# Patient Record
Sex: FEMALE | Race: WHITE | Employment: UNEMPLOYED | URBAN - METROPOLITAN AREA
[De-identification: names, ages, dates, MRNs, and addresses within clinical notes are randomized per-mention and may not be internally consistent; named-entity substitution may affect disease eponyms.]

---

## 2022-10-22 ENCOUNTER — HOSPITAL ENCOUNTER (EMERGENCY)
Age: 42
Discharge: HOME | End: 2022-10-23
Attending: EMERGENCY MEDICINE
Payer: COMMERCIAL

## 2022-10-22 VITALS
OXYGEN SATURATION: 100 % | HEART RATE: 98 BPM | DIASTOLIC BLOOD PRESSURE: 81 MMHG | BODY MASS INDEX: 29.12 KG/M2 | WEIGHT: 215 LBS | RESPIRATION RATE: 16 BRPM | SYSTOLIC BLOOD PRESSURE: 142 MMHG | HEIGHT: 72 IN | TEMPERATURE: 97.3 F

## 2022-10-22 DIAGNOSIS — B02.31 HERPES ZOSTER CONJUNCTIVITIS: Primary | ICD-10-CM

## 2022-10-22 PROCEDURE — 74011000250 HC RX REV CODE- 250: Performed by: EMERGENCY MEDICINE

## 2022-10-22 PROCEDURE — 99283 EMERGENCY DEPT VISIT LOW MDM: CPT

## 2022-10-22 PROCEDURE — 74011250637 HC RX REV CODE- 250/637: Performed by: EMERGENCY MEDICINE

## 2022-10-22 PROCEDURE — 74011636637 HC RX REV CODE- 636/637: Performed by: EMERGENCY MEDICINE

## 2022-10-22 RX ORDER — PROPARACAINE HYDROCHLORIDE 5 MG/ML
2 SOLUTION/ DROPS OPHTHALMIC
Status: COMPLETED | OUTPATIENT
Start: 2022-10-22 | End: 2022-10-22

## 2022-10-22 RX ORDER — VALACYCLOVIR HYDROCHLORIDE 500 MG/1
1000 TABLET, FILM COATED ORAL
Status: COMPLETED | OUTPATIENT
Start: 2022-10-22 | End: 2022-10-22

## 2022-10-22 RX ORDER — PREDNISONE 20 MG/1
60 TABLET ORAL
Status: COMPLETED | OUTPATIENT
Start: 2022-10-22 | End: 2022-10-22

## 2022-10-22 RX ADMIN — PREDNISONE 60 MG: 20 TABLET ORAL at 22:05

## 2022-10-22 RX ADMIN — VALACYCLOVIR HYDROCHLORIDE 1000 MG: 500 TABLET, FILM COATED ORAL at 22:05

## 2022-10-22 RX ADMIN — FLUORESCEIN SODIUM 1 STRIP: 0.6 STRIP OPHTHALMIC at 22:06

## 2022-10-22 RX ADMIN — PROPARACAINE HYDROCHLORIDE 2 DROP: 5 SOLUTION/ DROPS OPHTHALMIC at 22:06

## 2022-10-23 RX ORDER — VALACYCLOVIR HYDROCHLORIDE 1 G/1
1000 TABLET, FILM COATED ORAL 3 TIMES DAILY
Qty: 21 TABLET | Refills: 0 | Status: SHIPPED | OUTPATIENT
Start: 2022-10-23 | End: 2022-10-30

## 2022-10-23 RX ORDER — PREDNISOLONE ACETATE 10 MG/ML
1 SUSPENSION/ DROPS OPHTHALMIC 4 TIMES DAILY
Qty: 5 ML | Refills: 0 | Status: SHIPPED | OUTPATIENT
Start: 2022-10-23

## 2022-10-23 RX ORDER — PREDNISONE 20 MG/1
60 TABLET ORAL DAILY
Qty: 15 TABLET | Refills: 0 | Status: SHIPPED | OUTPATIENT
Start: 2022-10-23 | End: 2022-10-28

## 2022-10-23 NOTE — ED TRIAGE NOTES
Pt presents to ED ambulatory from triage with c/o OD redness, facial blistery painful rash, pt c/o h/a behind OD;  pt seen at Urgent Care and tx for sinus infections with antibx

## 2022-11-03 NOTE — ED PROVIDER NOTES
EMERGENCY DEPARTMENT HISTORY AND PHYSICAL EXAM    Date: 10/22/2022  Patient Name: Melissa Mota    History of Presenting Illness     Chief Complaint   Patient presents with    Rash         History Provided By: Patient    Additional History (Context):   9:55 PM   Melissa Mota is a 38 y.o. female with PMHX of, thyroid disease, arthritis and chronic back pain who presents to the emergency department C/O pressure-like pain. Patient reports being seen at urgent care center started antibiotics for sinus infection. She now has right ocular involvement and blisters on her right forehead. Symptoms started about 2 or 3 days ago. She denies any visual changes or photosensitivity. Social History  No longer smokes rarely drinks alcohol    Family History  No immunocompromise      PCP: Blair Srivastava MD    Current Outpatient Medications   Medication Sig Dispense Refill    prednisoLONE acetate (Pred Forte) 1 % ophthalmic suspension Administer 1 Drop to right eye four (4) times daily. 5 mL 0    norethindrone-ethinyl estradiol (ORTHO-NOVUM 1-35 TAB, NORTREL 1-35 TAB) 1-35 mg-mcg per tablet Take 1 Tab by mouth. Past History     Past Medical History:  Past Medical History:   Diagnosis Date    Arthritis     Chronic pain     DDD disk back    Thyroid disease        Past Surgical History:  Past Surgical History:   Procedure Laterality Date    BREAST SURGERY PROCEDURE UNLISTED      right breast lumpectomy       Family History:  No family history on file. Social History:  Social History     Tobacco Use    Smoking status: Former   Substance Use Topics    Alcohol use: Yes     Comment: occasional monthly       Allergies:  No Known Allergies      Review of Systems   Review of Systems   HENT:  Positive for congestion. Eyes:  Positive for redness. Negative for visual disturbance. Skin:  Positive for rash. All other systems reviewed and are negative.     Physical Exam     Vitals:    10/22/22 2104   BP: (!) 142/81 Pulse: 98   Resp: 16   Temp: 97.3 °F (36.3 °C)   SpO2: 100%   Weight: 97.5 kg (215 lb)   Height: 6' (1.829 m)     Physical Exam  Vitals and nursing note reviewed. Constitutional:       General: She is not in acute distress. Appearance: She is well-developed. She is not diaphoretic. HENT:      Head: Normocephalic and atraumatic. Right Ear: Tympanic membrane and external ear normal.      Left Ear: Tympanic membrane and external ear normal.      Nose:      Comments: No White sign  Eyes:      General: Lids are normal. No scleral icterus. Extraocular Movements:      Right eye: Normal extraocular motion. Left eye: Normal extraocular motion. Conjunctiva/sclera:      Right eye: Right conjunctiva is injected. No chemosis, exudate or hemorrhage. Left eye: Left conjunctiva is not injected. No chemosis, exudate or hemorrhage. Pupils: Pupils are equal, round, and reactive to light. Right eye: No corneal abrasion or fluorescein uptake. Left eye: No corneal abrasion or fluorescein uptake. Slit lamp exam:     Right eye: Anterior chamber quiet. No corneal ulcer. Left eye: Anterior chamber quiet. No corneal ulcer. Visual Fields: Right eye visual fields normal and left eye visual fields normal.      Comments: Trace vesicles noted to the upper eyelid. Neck:      Trachea: No tracheal deviation. Cardiovascular:      Rate and Rhythm: Normal rate. Pulmonary:      Effort: Pulmonary effort is normal. No respiratory distress. Breath sounds: No stridor. Musculoskeletal:         General: No tenderness. Normal range of motion. Cervical back: Normal range of motion and neck supple. Comments: Grossly unremarkable without abnormalities   Skin:     General: Skin is warm and dry. Capillary Refill: Capillary refill takes less than 2 seconds. Findings: Rash present. No erythema. Rash is vesicular.       Comments: Small vesicles to right side V1 dermatome   Neurological:      Mental Status: She is alert and oriented to person, place, and time. GCS: GCS eye subscore is 4. GCS verbal subscore is 5. GCS motor subscore is 6. Cranial Nerves: No cranial nerve deficit. Motor: No weakness. Psychiatric:         Mood and Affect: Mood normal.         Behavior: Behavior normal.         Thought Content: Thought content normal.         Judgment: Judgment normal.       Diagnostic Study Results     Labs -  No results found for this or any previous visit (from the past 24 hour(s)). Radiologic Studies -   No orders to display     CT Results  (Last 48 hours)      None          CXR Results  (Last 48 hours)      None            Medications given in the ED-  Medications   predniSONE (DELTASONE) tablet 60 mg (60 mg Oral Given 10/22/22 2205)   valACYclovir (VALTREX) tablet 1,000 mg (1,000 mg Oral Given 10/22/22 2205)   proparacaine (OPTHAINE) 0.5 % ophthalmic solution 2 Drop (2 Drops Right Eye Given 10/22/22 2206)   fluorescein (FLU-HILDA) 0.6 mg ophthalmic strip 1 Strip (1 Strip Both Eyes Given 10/22/22 2206)         Medical Decision Making   I am the first provider for this patient. I reviewed the vital signs, available nursing notes, past medical history, past surgical history, family history and social history. Vital Signs-Reviewed the patient's vital signs. Pulse Oximetry Analysis - 100% on room air    Records Reviewed: NURSING NOTES AND PREVIOUS MEDICAL RECORDS    Provider Notes (Medical Decision Making):   Patient with rash for hip pain likely be shingles or herpes. See no evidence of secondary bacterial infection. No photosensitivity. I cannot find dendrites for slit-lamp examination. We will start her on antivirals as well as steroids and refer to ophthalmology. There is no ophthalmologist on-call for our group but I do not see indication for emergency transfer with an ocular involvement.     Procedures:  Eye Stain      Date/Time: 10/22/2022 10:48 PM            Corneal abrasion was not present on eyelid eversion. Cornea is clear. Anterior chamber is clear. Patient tolerance: patient tolerated the procedure well with no immediate complications      ED Course:   9:55 PM : Initial assessment performed. The patients presenting problems have been discussed, and they are in agreement with the care plan formulated and outlined with them. I have encouraged them to ask questions as they arise throughout their visit. Diagnosis and Disposition       DISCHARGE NOTE:  12:52 AM  Paris Mar Eugene's  results have been reviewed with her. She has been counseled regarding her diagnosis, treatment, and plan. She verbally conveys understanding and agreement of the signs, symptoms, diagnosis, treatment and prognosis and additionally agrees to follow up as discussed. She also agrees with the care-plan and conveys that all of her questions have been answered. I have also provided discharge instructions for her that include: educational information regarding their diagnosis and treatment, and list of reasons why they would want to return to the ED prior to their follow-up appointment, should her condition change. She has been provided with education for proper emergency department utilization. CLINICAL IMPRESSION:    1. Herpes zoster conjunctivitis        PLAN:  1. D/C Home  2. Discharge Medication List as of 10/23/2022 12:59 AM        START taking these medications    Details   prednisoLONE acetate (Pred Forte) 1 % ophthalmic suspension Administer 1 Drop to right eye four (4) times daily. , Normal, Disp-5 mL, R-0      valACYclovir (VALTREX) 1 gram tablet Take 1 Tablet by mouth three (3) times daily for 7 days. , Normal, Disp-21 Tablet, R-0      predniSONE (DELTASONE) 20 mg tablet Take 3 Tablets by mouth daily for 5 days.  With Breakfast, Normal, Disp-15 Tablet, R-0           CONTINUE these medications which have NOT CHANGED    Details norethindrone-ethinyl estradiol (ORTHO-NOVUM 1-35 TAB, NORTREL 1-35 TAB) 1-35 mg-mcg per tablet Take 1 Tab by mouth. Historical Med, 1 Tab           3. Follow-up Information       Follow up With Specialties Details Why Bhavesh Keith MD Ophthalmology In 3 days  [place] [id number]  [phone]            _______________________________    This note was partially transcribed via voice recognition software. Although efforts have been made to catch any discrepancies, it may contain sound alike words, grammatical errors, or nonsensical words.

## 2023-03-01 ENCOUNTER — HOSPITAL ENCOUNTER (OUTPATIENT)
Facility: HOSPITAL | Age: 43
Discharge: HOME | End: 2023-03-04
Payer: COMMERCIAL

## 2023-03-01 LAB
HCT VFR BLD AUTO: 37.7 % (ref 35–45)
HGB BLD-MCNC: 12.1 G/DL (ref 12–16)

## 2023-03-01 PROCEDURE — 36415 COLL VENOUS BLD VENIPUNCTURE: CPT

## 2023-03-01 PROCEDURE — 85014 HEMATOCRIT: CPT

## 2023-03-08 ENCOUNTER — HOSPITAL ENCOUNTER (OUTPATIENT)
Facility: HOSPITAL | Age: 43
Setting detail: SPECIMEN
Discharge: HOME | End: 2023-03-11
Payer: COMMERCIAL

## 2023-03-08 PROCEDURE — 88305 TISSUE EXAM BY PATHOLOGIST: CPT
